# Patient Record
Sex: MALE | URBAN - METROPOLITAN AREA
[De-identification: names, ages, dates, MRNs, and addresses within clinical notes are randomized per-mention and may not be internally consistent; named-entity substitution may affect disease eponyms.]

---

## 2017-09-15 ENCOUNTER — OFFICE VISIT (OUTPATIENT)
Dept: FAMILY MEDICINE | Facility: CLINIC | Age: 27
End: 2017-09-15

## 2017-09-15 VITALS
OXYGEN SATURATION: 100 % | HEART RATE: 64 BPM | WEIGHT: 150 LBS | SYSTOLIC BLOOD PRESSURE: 120 MMHG | RESPIRATION RATE: 20 BRPM | HEIGHT: 68 IN | DIASTOLIC BLOOD PRESSURE: 70 MMHG | BODY MASS INDEX: 22.73 KG/M2 | TEMPERATURE: 98.1 F

## 2017-09-15 DIAGNOSIS — J30.81 ALLERGIC RHINITIS DUE TO ANIMAL HAIR AND DANDER, UNSPECIFIED CHRONICITY: ICD-10-CM

## 2017-09-15 DIAGNOSIS — Z00.00 ENCOUNTER FOR ROUTINE ADULT HEALTH EXAMINATION WITHOUT ABNORMAL FINDINGS: ICD-10-CM

## 2017-09-15 DIAGNOSIS — R76.12 POSITIVE QUANTIFERON-TB GOLD TEST: ICD-10-CM

## 2017-09-15 DIAGNOSIS — Z11.1 SCREENING EXAMINATION FOR PULMONARY TUBERCULOSIS: Primary | ICD-10-CM

## 2017-09-15 PROBLEM — J30.2 SEASONAL ALLERGIC RHINITIS: Status: ACTIVE | Noted: 2017-09-15

## 2017-09-15 RX ORDER — FLUTICASONE PROPIONATE 50 MCG
1-2 SPRAY, SUSPENSION (ML) NASAL DAILY
Qty: 1 BOTTLE | Refills: 11 | Status: SHIPPED | OUTPATIENT
Start: 2017-09-15 | End: 2018-06-07

## 2017-09-15 RX ORDER — LORATADINE 10 MG/1
10 TABLET ORAL DAILY
Qty: 30 TABLET | Refills: 1 | Status: SHIPPED | OUTPATIENT
Start: 2017-09-15 | End: 2019-03-13

## 2017-09-15 NOTE — MR AVS SNAPSHOT
After Visit Summary   9/15/2017    Tg Conteh    MRN: [id number]           Patient Information     Date Of Birth          1990        Visit Information        Provider Department      9/15/2017 2:20 PM Rossy Puri MD [facility]        Today's Diagnoses     Screening examination for pulmonary tuberculosis    -  1    Allergic rhinitis due to animal hair and dander, unspecified chronicity          Care Instructions    Here is the plan from today's visit    1. Screening examination for pulmonary tuberculosis  - Tuberculosis by Quantiferon    2. Allergic rhinitis due to animal hair and dander, unspecified chronicity  - fluticasone (FLONASE) 50 MCG/ACT spray; Spray 1-2 sprays into both nostrils daily  Dispense: 1 Bottle; Refill: 11  - loratadine (CLARITIN) 10 MG tablet; Take 1 tablet (10 mg) by mouth daily  Dispense: 30 tablet; Refill: 1  - You can use the flonase first, but if your symptoms do not improve that start using the Claritin.     Please call or return to clinic if your symptoms don't go away.    Follow up plan  Please make a clinic appointment for follow up with me (ROSSY PURI) in 1 year for routine visit or sooner if needed.    Thank you for coming to [facility] today.  Lab Testing:  **If you had lab testing today and your results are reassuring or normal they will be mailed to you or sent through [employer] within 7 days.   **If the lab tests need quick action we will call you with the results.  The phone number we will call with results is # [phone] (home) . If this is not the best number please call our clinic and change the number.  Medication Refills:  If you need any refills please call your pharmacy and they will contact us.   If you need to  your refill at a new pharmacy, please contact the new pharmacy directly. The new pharmacy will help you get your medications transferred faster.   Scheduling:  If you have any  concerns about today's visit or wish to schedule another appointment please call our office during normal business hours [phone] (8-5:00 M-F)  If a referral was made to a [facility] and you don't get a call from central scheduling please call [phone].  If a Mammogram was ordered for you at [facility] call [phone] to schedule or change your appointment.  If you had an XRay/CT/Ultrasound/MRI ordered the number is [phone] to schedule or change your radiology appointment.   Medical Concerns:  If you have urgent medical concerns please call [phone] at any time of the day.  If you have a medical emergency please call [phone].    Preventive Health Recommendations  Male Ages 26 - 39    Yearly exam:             See your health care provider every year in order to  o   Review health changes.   o   Discuss preventive care.    o   Review your medicines if your doctor has prescribed any.    You should be tested each year for STDs (sexually transmitted diseases), if you re at risk.     After age 35, talk to your provider about cholesterol testing. If you are at risk for heart disease, have your cholesterol tested at least every 5 years.     If you are at risk for diabetes, you should have a diabetes test (fasting glucose).  Shots: Get a flu shot each year. Get a tetanus shot every 10 years.     Nutrition:    Eat at least 5 servings of fruits and vegetables daily.     Eat whole-grain bread, whole-wheat pasta and brown rice instead of white grains and rice.     Talk to your provider about Calcium and Vitamin D.     Lifestyle    Exercise for at least 150 minutes a week (30 minutes a day, 5 days a week). This will help you control your weight and prevent disease.     Limit alcohol to one drink per day.     No smoking.     Wear sunscreen to prevent skin cancer.     See your dentist every six months for an exam and cleaning.             Follow-ups after your visit        Who to  "contact     Please call your clinic at [phone] to:    Ask questions about your health    Make or cancel appointments    Discuss your medicines    Learn about your test results    Speak to your doctor   If you have compliments or concerns about an experience at your clinic, or if you wish to file a complaint, please contact [facility] Patient Relations at [phone] or email us at [email or web address]         Additional Information About Your Visit        YourStreethart Information     [employer] gives you secure access to your electronic health record. If you see a primary care provider, you can also send messages to your care team and make appointments. If you have questions, please call your primary care clinic.  If you do not have a primary care provider, please call [phone] and they will assist you.      [email or web address] is an electronic gateway that provides easy, online access to your medical records. With [email or web address], you can request a clinic appointment, read your test results, renew a prescription or communicate with your care team.     To access your existing account, please contact your [facility] or call [phone] for assistance.        Care EveryWhere ID     This is your Care EveryWhere ID. This could be used by other organizations to access your [facility] medical records  [id number]        Your Vitals Were     Pulse Temperature Respirations Height Pulse Oximetry BMI (Body Mass Index)    64 98.1  F (36.7  C) (Oral) 20 5' 8.25\" (173.4 cm) 100% 22.64 kg/m2       Blood Pressure from Last 3 Encounters:   09/15/17 120/70   08/28/15 110/72   05/22/15 116/70    Weight from Last 3 Encounters:   09/15/17 150 lb (68 kg)   08/28/15 143 lb 12.8 oz (65.2 kg)   05/22/15 139 lb (63 kg)              We Performed the Following     Tuberculosis by Quantiferon          Today's Medication Changes          These changes are accurate as of: 9/15/17  3:39 PM.  If " you have any questions, ask your nurse or doctor.               These medicines have changed or have updated prescriptions.        Dose/Directions    * fluticasone 50 MCG/ACT spray   Commonly known as:  FLONASE   This may have changed:  Another medication with the same name was added. Make sure you understand how and when to take each.   Used for:  URI (upper respiratory infection)   Changed by:  Leticia Boudreaux MD        Dose:  1-2 spray   Spray 1-2 sprays into both nostrils daily   Quantity:  16 g   Refills:  1       * fluticasone 50 MCG/ACT spray   Commonly known as:  FLONASE   This may have changed:  You were already taking a medication with the same name, and this prescription was added. Make sure you understand how and when to take each.   Used for:  Allergic rhinitis due to animal hair and dander, unspecified chronicity   Changed by:  Rossy Nash MD        Dose:  1-2 spray   Spray 1-2 sprays into both nostrils daily   Quantity:  1 Bottle   Refills:  11       * loratadine 10 MG tablet   Commonly known as:  CLARITIN   This may have changed:  Another medication with the same name was added. Make sure you understand how and when to take each.   Used for:  Chronic rhinitis   Changed by:  Vickie Luis MD        Dose:  10 mg   Take 1 tablet (10 mg) by mouth daily   Quantity:  30 tablet   Refills:  1       * loratadine 10 MG tablet   Commonly known as:  CLARITIN   This may have changed:  You were already taking a medication with the same name, and this prescription was added. Make sure you understand how and when to take each.   Used for:  Allergic rhinitis due to animal hair and dander, unspecified chronicity   Changed by:  Rossy Nash MD        Dose:  10 mg   Take 1 tablet (10 mg) by mouth daily   Quantity:  30 tablet   Refills:  1       * Notice:  This list has 4 medication(s) that are the same as other medications prescribed for you. Read the directions carefully, and ask  your doctor or other care provider to review them with you.         Where to get your medicines      These medications were sent to [facility] [place], [place] - [address], [facility] [address]     Phone:  [phone]     fluticasone 50 MCG/ACT spray    loratadine 10 MG tablet                Primary Care Provider Office Phone # Fax #    Rossy Mone Nash [phone]       [facility] [address]  [facility] [address]        Equal Access to Services     HILARY OLSEN : Hadii aad ku hadasho Soomaali, waaxda luqadaha, qaybta kaalmada adeegyada, waxay idiin hayaan kathy atkinson . So [facility] [phone].    ATENCIÓN: Si habla español, tiene a torres disposición servicios gratuitos de asistencia lingüística. Feliciaame al [phone].    We comply with applicable federal civil rights laws and [place] laws. We do not discriminate on the basis of race, color, national origin, age, disability sex, sexual orientation or gender identity.            Thank you!     Thank you for choosing [facility]  for your care. Our goal is always to provide you with excellent care. Hearing back from our patients is one way we can continue to improve our services. Please take a few minutes to complete the written survey that you may receive in the mail after your visit with us. Thank you!             Your Updated Medication List - Protect others around you: Learn how to safely use, store and throw away your medicines at www.disposemymeds.org.          This list is accurate as of: 9/15/17  3:39 PM.  Always use your most recent med list.                   Brand Name Dispense Instructions for use Diagnosis    * fluticasone 50 MCG/ACT spray    FLONASE    16 g    Spray 1-2 sprays into both nostrils daily    URI (upper respiratory infection)       * fluticasone 50 MCG/ACT spray    FLONASE    1 Bottle    Spray 1-2 sprays into both nostrils daily     Allergic rhinitis due to animal hair and dander, unspecified chronicity       * loratadine 10 MG tablet    CLARITIN    30 tablet    Take 1 tablet (10 mg) by mouth daily    Chronic rhinitis       * loratadine 10 MG tablet    CLARITIN    30 tablet    Take 1 tablet (10 mg) by mouth daily    Allergic rhinitis due to animal hair and dander, unspecified chronicity       * Notice:  This list has 4 medication(s) that are the same as other medications prescribed for you. Read the directions carefully, and ask your doctor or other care provider to review them with you.

## 2017-09-15 NOTE — PROGRESS NOTES
Preceptor Attestation:   Patient seen and discussed with the resident. Assessment and plan reviewed with resident and agreed upon.   Supervising Physician:  Dre Roberts MD  [facility]

## 2017-09-15 NOTE — PATIENT INSTRUCTIONS
Here is the plan from today's visit    1. Screening examination for pulmonary tuberculosis  - Tuberculosis by Quantiferon    2. Allergic rhinitis due to animal hair and dander, unspecified chronicity  - fluticasone (FLONASE) 50 MCG/ACT spray; Spray 1-2 sprays into both nostrils daily  Dispense: 1 Bottle; Refill: 11  - loratadine (CLARITIN) 10 MG tablet; Take 1 tablet (10 mg) by mouth daily  Dispense: 30 tablet; Refill: 1  - You can use the flonase first, but if your symptoms do not improve that start using the Claritin.     Please call or return to clinic if your symptoms don't go away.    Follow up plan  Please make a clinic appointment for follow up with me (SHILOH PURI) in 1 year for routine visit or sooner if needed.    Thank you for coming to [facility] today.  Lab Testing:  **If you had lab testing today and your results are reassuring or normal they will be mailed to you or sent through [email or web address] within 7 days.   **If the lab tests need quick action we will call you with the results.  The phone number we will call with results is # [phone] (home) . If this is not the best number please call our clinic and change the number.  Medication Refills:  If you need any refills please call your pharmacy and they will contact us.   If you need to  your refill at a new pharmacy, please contact the new pharmacy directly. The new pharmacy will help you get your medications transferred faster.   Scheduling:  If you have any concerns about today's visit or wish to schedule another appointment please call our office during normal business hours [phone] (8-5:00 M-F)  If a referral was made to a [facility] and you don't get a call from central scheduling please call [phone].  If a Mammogram was ordered for you at [facility] call [phone] to schedule or change your appointment.  If you had an XRay/CT/Ultrasound/MRI ordered the number is [phone] to  schedule or change your radiology appointment.   Medical Concerns:  If you have urgent medical concerns please call [phone] at any time of the day.  If you have a medical emergency please call [phone].    Preventive Health Recommendations  Male Ages 26 - 39    Yearly exam:             See your health care provider every year in order to  o   Review health changes.   o   Discuss preventive care.    o   Review your medicines if your doctor has prescribed any.    You should be tested each year for STDs (sexually transmitted diseases), if you re at risk.     After age 35, talk to your provider about cholesterol testing. If you are at risk for heart disease, have your cholesterol tested at least every 5 years.     If you are at risk for diabetes, you should have a diabetes test (fasting glucose).  Shots: Get a flu shot each year. Get a tetanus shot every 10 years.     Nutrition:    Eat at least 5 servings of fruits and vegetables daily.     Eat whole-grain bread, whole-wheat pasta and brown rice instead of white grains and rice.     Talk to your provider about Calcium and Vitamin D.     Lifestyle    Exercise for at least 150 minutes a week (30 minutes a day, 5 days a week). This will help you control your weight and prevent disease.     Limit alcohol to one drink per day.     No smoking.     Wear sunscreen to prevent skin cancer.     See your dentist every six months for an exam and cleaning.

## 2017-09-15 NOTE — PROGRESS NOTES
Male Physical Note          HPI         Concerns today: need quantiferon test for job. Would like to discuss allergies.       Patient Active Problem List   Diagnosis     Seasonal allergic rhinitis       History reviewed. No pertinent past medical history.    Previous Medical Care   [facility]      Family History   Problem Relation Age of Onset     Asthma Mother      DIABETES No family hx of      Coronary Artery Disease No family hx of      Hypertension No family hx of      Cancer - colorectal No family hx of      Prostate Cancer No family hx of               Review of Systems:     Review of Systems:  CONSTITUTIONAL: NEGATIVE for fever, chills, change in weight  INTEGUMENTARY/SKIN: NEGATIVE for worrisome rashes, moles or lesions  EYES: NEGATIVE for vision changes or irritation  ENT/MOUTH: NEGATIVE for ear, mouth and throat problems  RESP: NEGATIVE for significant cough or SOB  BREAST: NEGATIVE for masses, tenderness or discharge  CV: NEGATIVE for chest pain, palpitations or peripheral edema  GI: NEGATIVE for nausea, abdominal pain, heartburn, or change in bowel habits  : NEGATIVE for frequency, dysuria, or hematuria  MUSCULOSKELETAL: NEGATIVE for significant arthralgias or myalgia  NEURO: NEGATIVE for weakness, dizziness or paresthesias  ENDOCRINE: NEGATIVE for temperature intolerance, skin/hair changes  HEME/ALLERGY: NEGATIVE for bleeding problems  PSYCHIATRIC: NEGATIVE for changes in mood or affect  Sleep:   Do you snore most or the night (as reported by a family member)? No  Do you feel sleepy or extremely tired during most of the day? 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     Social History Main Topics     Smoking status: Never Smoker     Smokeless tobacco: Never Used     Alcohol use No     Drug use: No     Sexual  "activity: Yes     Partners: Female     Other Topics Concern     Not on file     Social History Narrative       Marital Status:, wife in [place], one child.   Who lives in your household? Cousin, mother.     Has anyone hurt you physically, for example by pushing, hitting, slapping or kicking you or forcing you to have sex? Denies  Do you feel threatened or controlled by a partner, ex-partner or anyone in your life? Denies    Sexual Health     Sexual concerns: No   STI History: Neg    Recommended Screening     Blood Pressure screen. No issues, normal BP today.          Physical Exam:     Vitals: /70  Pulse 64  Temp 98.1  F (36.7  C) (Oral)  Resp 20  Ht 1.734 m (5' 8.25\")  Wt 68 kg (150 lb)  SpO2 100%  BMI 22.64 kg/m2  BMI= Body mass index is 22.64 kg/(m^2).  GENERAL: healthy, alert and no distress  EYES: Eyes grossly normal to inspection, extraocular movements - intact, and PERRL  HENT: ear canals- normal; TMs- normal; Nose- normal; Mouth- no ulcers, no lesions  NECK: no tenderness, no adenopathy, no asymmetry, no masses, no stiffness; thyroid- normal to palpation  RESP: lungs clear to auscultation - no rales, no rhonchi, no wheezes  BREAST: no masses, no tenderness, no nipple discharge, no palpable axillary masses or adenopathy  CV: regular rates and rhythm, normal S1 S2, no S3 or S4 and no murmur, no click or rub -  ABDOMEN: soft, no tenderness, no  hepatosplenomegaly, no masses, normal bowel sounds  MS: extremities- no gross deformities noted, no edema  SKIN: no suspicious lesions, no rashes  NEURO: strength and tone- normal, sensory exam- grossly normal, mentation- intact, speech- normal, reflexes- symmetric  BACK: no CVA tenderness, no paralumbar tenderness  - male: deferred due to patient request due to no acute issues.   RECTAL- male: deferred due to patient request due to no acute issues.  PSYCH: Alert and oriented times 3; speech- coherent , normal rate and volume; able to articulate " logical thoughts, able to abstract reason, no tangential thoughts, no hallucinations or delusions, affect- normal  LYMPHATICS: ant. cervical- normal, post. cervical- normal, axillary- normal, supraclavicular- normal    Assessment and Plan      Tg was seen today for physical.    Diagnoses and all orders for this visit:    Screening examination for pulmonary tuberculosis  -     Tuberculosis by Quantiferon    Allergic rhinitis due to animal hair and dander, unspecified chronicity  -     fluticasone (FLONASE) 50 MCG/ACT spray; Spray 1-2 sprays into both nostrils daily  -     loratadine (CLARITIN) 10 MG tablet; Take 1 tablet (10 mg) by mouth daily    Encounter for routine adult health examination without abnormal findings        Options for treatment and follow-up care were reviewed with the patient. Tg Elias Rolonan and/or guardian engaged in the decision making process and verbalized understanding of the options discussed and agreed with the final plan.    Rossy Nash MD

## 2017-09-19 LAB
M TB TUBERC IFN-G BLD QL: POSITIVE
M TB TUBERC IFN-G/MITOGEN IGNF BLD: 7.23 IU/ML

## 2018-06-07 ENCOUNTER — OFFICE VISIT (OUTPATIENT)
Dept: FAMILY MEDICINE | Facility: CLINIC | Age: 28
End: 2018-06-07
Payer: COMMERCIAL

## 2018-06-07 VITALS
WEIGHT: 147.2 LBS | HEART RATE: 65 BPM | SYSTOLIC BLOOD PRESSURE: 126 MMHG | DIASTOLIC BLOOD PRESSURE: 82 MMHG | OXYGEN SATURATION: 96 % | BODY MASS INDEX: 22.22 KG/M2

## 2018-06-07 DIAGNOSIS — R09.81 NASAL CONGESTION: ICD-10-CM

## 2018-06-07 DIAGNOSIS — J30.81 ALLERGIC RHINITIS DUE TO ANIMAL HAIR AND DANDER, UNSPECIFIED CHRONICITY: Primary | ICD-10-CM

## 2018-06-07 RX ORDER — LORATADINE 10 MG/1
10 TABLET ORAL DAILY
Qty: 90 TABLET | Refills: 1 | Status: SHIPPED | OUTPATIENT
Start: 2018-06-07 | End: 2019-03-13

## 2018-06-07 RX ORDER — OXYMETAZOLINE HYDROCHLORIDE 0.05 G/100ML
2-3 SPRAY NASAL 2 TIMES DAILY PRN
Qty: 1 BOTTLE | Refills: 0 | Status: SHIPPED | OUTPATIENT
Start: 2018-06-07 | End: 2019-03-13

## 2018-06-07 RX ORDER — FLUTICASONE PROPIONATE 50 MCG
1-2 SPRAY, SUSPENSION (ML) NASAL DAILY
Qty: 1 BOTTLE | Refills: 11 | Status: SHIPPED | OUTPATIENT
Start: 2018-06-07 | End: 2019-03-13

## 2018-06-07 ASSESSMENT — ENCOUNTER SYMPTOMS
SINUS PRESSURE: 0
HEADACHES: 1
FEVER: 0
TROUBLE SWALLOWING: 0
VOICE CHANGE: 0
WHEEZING: 0
CONSTIPATION: 0
LIGHT-HEADEDNESS: 0
EYE ITCHING: 1
SORE THROAT: 1
DIARRHEA: 0
COUGH: 1
ACTIVITY CHANGE: 0
SHORTNESS OF BREATH: 0
MYALGIAS: 0
FATIGUE: 0
EYE REDNESS: 1
RHINORRHEA: 1
APPETITE CHANGE: 0
ABDOMINAL PAIN: 0
NAUSEA: 0
EYE DISCHARGE: 1
CHILLS: 0
ARTHRALGIAS: 0
WEAKNESS: 0

## 2018-06-07 NOTE — PROGRESS NOTES
Preceptor Attestation:   Patient seen, evaluated and discussed with the resident. I have verified the content of the note, which accurately reflects my assessment of the patient and the plan of care.   Supervising Physician:  Hernan Heard MD MD

## 2018-06-07 NOTE — PATIENT INSTRUCTIONS
Here is the plan from today's visit    1. Allergic rhinitis due to animal hair and dander, unspecified chronicity  - fluticasone (FLONASE) 50 MCG/ACT spray; Spray 1-2 sprays into both nostrils daily  Dispense: 1 Bottle; Refill: 11  - loratadine (CLARITIN) 10 MG tablet; Take 1 tablet (10 mg) by mouth daily  Dispense: 90 tablet; Refill: 1  - ketotifen (ZADITOR/REFRESH ANTI-ITCH) 0.025 % SOLN ophthalmic solution; Place 1 drop into both eyes 2 times daily  Dispense: 1 Bottle; Refill: 1  - Would highly recommend that you use the fluticasone daily in addition to the claritin (tablet) and the eye drops.     2. Nasal congestion  - oxymetazoline (AFRIN NASAL SPRAY) 0.05 % spray; Spray 2-3 sprays into both nostrils 2 times daily as needed for congestion For 3 days and then stop using.  Dispense: 1 Bottle; Refill: 0  - do not take for more than 3 days at time. You can use this medication up to 3 times a month (for 3 days at a time).       Please call or return to clinic if your symptoms don't go away.    Follow up plan  Please make a clinic appointment for follow up with me (SHILOH PURI) in 3-4  weeks for follow up.    Thank you for coming to [facility] today.  Lab Testing:  **If you had lab testing today and your results are reassuring or normal they will be mailed to you or sent through [employer] within 7 days.   **If the lab tests need quick action we will call you with the results.  The phone number we will call with results is # [phone] (home) . If this is not the best number please call our clinic and change the number.  Medication Refills:  If you need any refills please call your pharmacy and they will contact us.   If you need to  your refill at a new pharmacy, please contact the new pharmacy directly. The new pharmacy will help you get your medications transferred faster.   Scheduling:  If you have any concerns about today's visit or wish to schedule another appointment please call our office  during normal business hours [phone] (8-5:00 M-F)  If a referral was made to a [facility] and you don't get a call from central scheduling please call [phone].  If a Mammogram was ordered for you at [facility] call [phone] to schedule or change your appointment.  If you had an XRay/CT/Ultrasound/MRI ordered the number is [phone] to schedule or change your radiology appointment.   Medical Concerns:  If you have urgent medical concerns please call [phone] at any time of the day.    Rossy Nash MD

## 2018-06-07 NOTE — MR AVS SNAPSHOT
After Visit Summary   6/7/2018    Tg Conteh    MRN: [id number]           Patient Information     Date Of Birth          1990        Visit Information        Provider Department      6/7/2018 9:40 AM Rossy Puri MD [facility]        Today's Diagnoses     Nasal congestion    -  1    Allergic rhinitis due to animal hair and dander, unspecified chronicity          Care Instructions    Here is the plan from today's visit    1. Allergic rhinitis due to animal hair and dander, unspecified chronicity  - fluticasone (FLONASE) 50 MCG/ACT spray; Spray 1-2 sprays into both nostrils daily  Dispense: 1 Bottle; Refill: 11  - loratadine (CLARITIN) 10 MG tablet; Take 1 tablet (10 mg) by mouth daily  Dispense: 90 tablet; Refill: 1  - ketotifen (ZADITOR/REFRESH ANTI-ITCH) 0.025 % SOLN ophthalmic solution; Place 1 drop into both eyes 2 times daily  Dispense: 1 Bottle; Refill: 1  - Would highly recommend that you use the fluticasone daily in addition to the claritin (tablet) and the eye drops.     2. Nasal congestion  - oxymetazoline (AFRIN NASAL SPRAY) 0.05 % spray; Spray 2-3 sprays into both nostrils 2 times daily as needed for congestion For 3 days and then stop using.  Dispense: 1 Bottle; Refill: 0  - do not take for more than 3 days at time. You can use this medication up to 3 times a month (for 3 days at a time).       Please call or return to clinic if your symptoms don't go away.    Follow up plan  Please make a clinic appointment for follow up with me (ROSSY PURI) in 3-4  weeks for follow up.    Thank you for coming to [facility] today.  Lab Testing:  **If you had lab testing today and your results are reassuring or normal they will be mailed to you or sent through "[employer] within 7 days.   **If the lab tests need quick action we will call you with the results.  The phone number we will call with results is # [phone] (home) . If this is not the  best number please call our clinic and change the number.  Medication Refills:  If you need any refills please call your pharmacy and they will contact us.   If you need to  your refill at a new pharmacy, please contact the new pharmacy directly. The new pharmacy will help you get your medications transferred faster.   Scheduling:  If you have any concerns about today's visit or wish to schedule another appointment please call our office during normal business hours [phone] (8-5:00 M-F)  If a referral was made to a [facility] and you don't get a call from central scheduling please call [phone].  If a Mammogram was ordered for you at [facility] call [phone] to schedule or change your appointment.  If you had an XRay/CT/Ultrasound/MRI ordered the number is [phone] to schedule or change your radiology appointment.   Medical Concerns:  If you have urgent medical concerns please call [phone] at any time of the day.    Rossy Nash MD            Follow-ups after your visit        Who to contact     Please call your clinic at [phone] to:    Ask questions about your health    Make or cancel appointments    Discuss your medicines    Learn about your test results    Speak to your doctor            Additional Information About Your Visit        [employer] Information     [employer] gives you secure access to your electronic health record. If you see a primary care provider, you can also send messages to your care team and make appointments. If you have questions, please call your primary care clinic.  If you do not have a primary care provider, please call [phone] and they will assist you.      [employer] is an electronic gateway that provides easy, online access to your medical records. With [employer], you can request a clinic appointment, read your test results, renew a prescription or communicate with your care team.     To access your existing account,  please contact your [facility] or call [phone] for assistance.        Care EveryWhere ID     This is your Care EveryWhere ID. This could be used by other organizations to access your [facility] medical records  [id number]        Your Vitals Were     Pulse Pulse Oximetry BMI (Body Mass Index)             65 96% 22.22 kg/m2          Blood Pressure from Last 3 Encounters:   06/07/18 126/82   09/15/17 120/70   08/28/15 110/72    Weight from Last 3 Encounters:   06/07/18 147 lb 3.2 oz (66.8 kg)   09/15/17 150 lb (68 kg)   08/28/15 143 lb 12.8 oz (65.2 kg)              Today, you had the following     No orders found for display         Today's Medication Changes          These changes are accurate as of 6/7/18 10:33 AM.  If you have any questions, ask your nurse or doctor.               Start taking these medicines.        Dose/Directions    ketotifen 0.025 % Soln ophthalmic solution   Commonly known as:  ZADITOR/REFRESH ANTI-ITCH   Used for:  Allergic rhinitis due to animal hair and dander, unspecified chronicity   Started by:  Rossy Nash MD        Dose:  1 drop   Place 1 drop into both eyes 2 times daily   Quantity:  1 Bottle   Refills:  1       oxymetazoline 0.05 % spray   Commonly known as:  AFRIN NASAL SPRAY   Used for:  Nasal congestion   Started by:  Rsosy Nash MD        Dose:  2-3 spray   Spray 2-3 sprays into both nostrils 2 times daily as needed for congestion For 3 days and then stop using.   Quantity:  1 Bottle   Refills:  0         These medicines have changed or have updated prescriptions.        Dose/Directions    * loratadine 10 MG tablet   Commonly known as:  CLARITIN   This may have changed:  Another medication with the same name was added. Make sure you understand how and when to take each.   Used for:  Allergic rhinitis due to animal hair and dander, unspecified chronicity   Changed by:  Rossy Nash MD        Dose:  10 mg   Take 1  tablet (10 mg) by mouth daily   Quantity:  30 tablet   Refills:  1       * loratadine 10 MG tablet   Commonly known as:  CLARITIN   This may have changed:  You were already taking a medication with the same name, and this prescription was added. Make sure you understand how and when to take each.   Used for:  Allergic rhinitis due to animal hair and dander, unspecified chronicity   Changed by:  Rossy Nash MD        Dose:  10 mg   Take 1 tablet (10 mg) by mouth daily   Quantity:  90 tablet   Refills:  1       * Notice:  This list has 2 medication(s) that are the same as other medications prescribed for you. Read the directions carefully, and ask your doctor or other care provider to review them with you.         Where to get your medicines      These medications were sent to [facility] [place], [place] - [address] [facility] [address]     Phone:  [phone]     fluticasone 50 MCG/ACT spray    ketotifen 0.025 % Soln ophthalmic solution    loratadine 10 MG tablet    oxymetazoline 0.05 % spray                Primary Care Provider Office Phone # Fax #    Rossy Nash [phone]       [facility] [address]  [facility] [address]        Equal Access to Services     HILARY OLSEN AH: Hadii solomon connell hadasho Soomaali, waaxda luqadaha, qaybta kaalmada adeegyada, waxay nathalyin hayjayan kathy shoemaker. So [facility] [phone].    ATENCIÓN: Si habla español, tiene a torres disposición servicios gratuitos de asistencia lingüística. Llame al [phone].    We comply with applicable federal civil rights laws and [place] laws. We do not discriminate on the basis of race, color, national origin, age, disability, sex, sexual orientation, or gender identity.            Thank you!     Thank you for choosing [facility]  for your care. Our goal is always to provide you with excellent care. Hearing back from our patients is one  way we can continue to improve our services. Please take a few minutes to complete the written survey that you may receive in the mail after your visit with us. Thank you!             Your Updated Medication List - Protect others around you: Learn how to safely use, store and throw away your medicines at www.disposemymeds.org.          This list is accurate as of 6/7/18 10:33 AM.  Always use your most recent med list.                   Brand Name Dispense Instructions for use Diagnosis    fluticasone 50 MCG/ACT spray    FLONASE    1 Bottle    Spray 1-2 sprays into both nostrils daily    Allergic rhinitis due to animal hair and dander, unspecified chronicity       ketotifen 0.025 % Soln ophthalmic solution    ZADITOR/REFRESH ANTI-ITCH    1 Bottle    Place 1 drop into both eyes 2 times daily    Allergic rhinitis due to animal hair and dander, unspecified chronicity       * loratadine 10 MG tablet    CLARITIN    30 tablet    Take 1 tablet (10 mg) by mouth daily    Allergic rhinitis due to animal hair and dander, unspecified chronicity       * loratadine 10 MG tablet    CLARITIN    90 tablet    Take 1 tablet (10 mg) by mouth daily    Allergic rhinitis due to animal hair and dander, unspecified chronicity       oxymetazoline 0.05 % spray    AFRIN NASAL SPRAY    1 Bottle    Spray 2-3 sprays into both nostrils 2 times daily as needed for congestion For 3 days and then stop using.    Nasal congestion       * Notice:  This list has 2 medication(s) that are the same as other medications prescribed for you. Read the directions carefully, and ask your doctor or other care provider to review them with you.

## 2018-07-02 ENCOUNTER — APPOINTMENT (OUTPATIENT)
Dept: FAMILY MEDICINE | Facility: CLINIC | Age: 28
End: 2018-07-02
Payer: COMMERCIAL

## 2019-03-13 ENCOUNTER — OFFICE VISIT (OUTPATIENT)
Dept: FAMILY MEDICINE | Facility: CLINIC | Age: 29
End: 2019-03-13
Payer: COMMERCIAL

## 2019-03-13 VITALS
BODY MASS INDEX: 24.21 KG/M2 | WEIGHT: 160.4 LBS | HEART RATE: 66 BPM | SYSTOLIC BLOOD PRESSURE: 109 MMHG | OXYGEN SATURATION: 97 % | TEMPERATURE: 98.2 F | DIASTOLIC BLOOD PRESSURE: 61 MMHG

## 2019-03-13 DIAGNOSIS — J30.81 ALLERGIC RHINITIS DUE TO ANIMAL HAIR AND DANDER: Primary | ICD-10-CM

## 2019-03-13 RX ORDER — LORATADINE 10 MG/1
10 TABLET ORAL DAILY
Qty: 90 TABLET | Refills: 1 | Status: SHIPPED | OUTPATIENT
Start: 2019-03-13

## 2019-03-13 RX ORDER — FLUTICASONE PROPIONATE 50 MCG
1-2 SPRAY, SUSPENSION (ML) NASAL DAILY
Qty: 18.2 ML | Refills: 0 | Status: SHIPPED | OUTPATIENT
Start: 2019-03-13

## 2019-03-13 ASSESSMENT — ENCOUNTER SYMPTOMS
FEVER: 0
SHORTNESS OF BREATH: 0
DIARRHEA: 0
WHEEZING: 0
SORE THROAT: 1
CHILLS: 0
ABDOMINAL PAIN: 0
VOMITING: 0
EYES NEGATIVE: 1
RHINORRHEA: 1
COUGH: 1

## 2019-03-13 NOTE — PROGRESS NOTES
HPI       Tg Conteh is a 29 year old  who presents for   Chief Complaint   Patient presents with     RECHECK     sore throat x1 week     Acute Illness   Concerns: sore throat, worse in the morning when he wakes up, spit out saliva with bloody sputum for 4 days about 2 days ago, feels itchy in throat and ears   When did it start? One week   Is it getting better, worse or staying the same? unchanged    Fatigue/Achiness?:No     Fever?: No     Chills/Sweats?: No     Headache (location?)No     Sinus Pressure?:No     Eye redness/Discharge?: No     Ear Pain?: No     Runny nose?: No     Congestion?:  YES mild nasal congestion, throat congestion     Sore Throat?: No   Respiratory    Cough?:  YES-non-productive    Wheeze?: No   GI/    Decreased Appetite?: No     Nausea?:No     Vomiting?: No     Diarrhea?:  No     Dysuria/Frequency?.:No       Any Illness Exposure?:  none    Any foreign travel or contact with anyone ill who travelled abroad?  YES Traveled to [facility] and [facility] without sickness and returned to  12/2018    Therapies Tried and outcome: Nyquil cold and flu, Details: helps a little     - Also using warm tea with lemon and chito that helps his sore throat  - He used Flonase last year that helped allergy symptoms  - He completed latent TB treatment in 2012 with isoniazid.     +++++++    Problem, Medication and Allergy Lists were reviewed and updated if needed..    Patient is an established patient of this clinic.  No past medical history on file.  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   Other Topics Concern     Not on file   Social History Narrative     Not on file   Came to [place] in [place] from [place]          Review of Systems:   Review of Systems   Constitutional: Negative for chills and fever.   HENT: Positive for congestion, postnasal drip, rhinorrhea and sore throat. Negative for ear pain.    Eyes: Negative.    Respiratory: Positive for cough. Negative for shortness of breath and wheezing.    Cardiovascular: Negative for chest pain.   Gastrointestinal: Negative for abdominal pain, diarrhea and vomiting.            Physical Exam:     Vitals:    03/13/19 0937   BP: 109/61   Pulse: 66   Temp: 98.2  F (36.8  C)   TempSrc: Oral   SpO2: 97%   Weight: 72.8 kg (160 lb 6.4 oz)     Body mass index is 24.21 kg/m .  Vitals were reviewed and were normal     Physical Exam   Constitutional: He appears well-developed and well-nourished. No distress.   HENT:   Head: Normocephalic and atraumatic.   Cobblestoning of throat   Eyes: Conjunctivae are normal. No scleral icterus.   Cardiovascular: Normal rate, regular rhythm and normal heart sounds.   No murmur heard.  Pulmonary/Chest: Effort normal and breath sounds normal. He has no wheezes.   Abdominal: Soft. Bowel sounds are normal. He exhibits no distension. There is no tenderness.   Skin: Skin is warm and dry. No rash noted.   Psychiatric: He has a normal mood and affect. His behavior is normal.         Results:      No results for this visit.   Assessment and Plan        Tg was seen today for recheck.    Diagnoses and all orders for this visit:    Allergic rhinitis due to animal hair and dander  Recommended flonase nasal spray and claritin daily. Follow up if no improvement in 2 weeks.   -     fluticasone (FLONASE) 50 MCG/ACT nasal spray; Spray 1-2 sprays into both nostrils daily  -     loratadine (CLARITIN) 10 MG tablet; Take 1 tablet (10 mg) by mouth daily          There are no discontinued medications.    Options for treatment and follow-up care were reviewed with the patient. Tg Conteh  engaged in the decision making process and verbalized understanding of the options discussed and agreed with the final plan.    Sylvia Fischer,

## 2019-03-13 NOTE — PATIENT INSTRUCTIONS
Here is the plan from today's visit    1. Allergic rhinitis due to animal hair and dander  - fluticasone (FLONASE) 50 MCG/ACT nasal spray; Spray 1-2 sprays into both nostrils daily  Dispense: 18.2 mL; Refill: 0  - loratadine (CLARITIN) 10 MG tablet; Take 1 tablet (10 mg) by mouth daily  Dispense: 90 tablet; Refill: 1    Please call or return to clinic if your symptoms don't go away.    Follow up plan  Follow up if you are not improving in the next 1-2 weeks.     Thank you for coming to [facility] today.  Lab Testing:  **If you had lab testing today and your results are reassuring or normal they will be mailed to you or sent through [employer] within 7 days.   **If the lab tests need quick action we will call you with the results.  The phone number we will call with results is # [phone] (home) . If this is not the best number please call our clinic and change the number.  Medication Refills:  If you need any refills please call your pharmacy and they will contact us.   If you need to  your refill at a new pharmacy, please contact the new pharmacy directly. The new pharmacy will help you get your medications transferred faster.   Scheduling:  If you have any concerns about today's visit or wish to schedule another appointment please call our office during normal business hours [phone] (8-5:00 M-F)  If a referral was made to a [facility] and you don't get a call from central scheduling please call [phone].  If a Mammogram was ordered for you at [facility] call [phone] to schedule or change your appointment.  If you had an XRay/CT/Ultrasound/MRI ordered the number is [phone] to schedule or change your radiology appointment.   Medical Concerns:  If you have urgent medical concerns please call [phone] at any time of the day.    Sylvia Fischer MD

## 2019-03-19 NOTE — PROGRESS NOTES
Preceptor Attestation:   Patient seen, evaluated and discussed with the resident. I have verified the content of the note, which accurately reflects my assessment of the patient and the plan of care.   Supervising Physician:  Jose G Caceres MD

## 2019-05-02 ENCOUNTER — OFFICE VISIT (OUTPATIENT)
Dept: FAMILY MEDICINE | Facility: CLINIC | Age: 29
End: 2019-05-02
Payer: COMMERCIAL

## 2019-05-02 VITALS
HEIGHT: 69 IN | SYSTOLIC BLOOD PRESSURE: 122 MMHG | TEMPERATURE: 98 F | BODY MASS INDEX: 22.36 KG/M2 | HEART RATE: 66 BPM | RESPIRATION RATE: 18 BRPM | DIASTOLIC BLOOD PRESSURE: 78 MMHG | WEIGHT: 151 LBS | OXYGEN SATURATION: 97 %

## 2019-05-02 DIAGNOSIS — J30.2 SEASONAL ALLERGIES: Primary | ICD-10-CM

## 2019-05-02 DIAGNOSIS — J31.0 CHRONIC RHINITIS: ICD-10-CM

## 2019-05-02 RX ORDER — PSEUDOEPHEDRINE HCL 120 MG/1
120 TABLET, FILM COATED, EXTENDED RELEASE ORAL EVERY MORNING
Qty: 30 TABLET | Refills: 0 | Status: SHIPPED | OUTPATIENT
Start: 2019-05-02

## 2019-05-02 RX ORDER — OLOPATADINE HYDROCHLORIDE 2 MG/ML
1 SOLUTION/ DROPS OPHTHALMIC DAILY
Qty: 2.5 ML | Refills: 3 | Status: SHIPPED | OUTPATIENT
Start: 2019-05-02

## 2019-05-02 ASSESSMENT — ENCOUNTER SYMPTOMS
FATIGUE: 0
SINUS PAIN: 0
DIARRHEA: 0
EYE ITCHING: 1
VOMITING: 0
ABDOMINAL PAIN: 0
NAUSEA: 0
EYE REDNESS: 1
WHEEZING: 0
FEVER: 0
SORE THROAT: 1
COUGH: 0
CHILLS: 0
SINUS PRESSURE: 0

## 2019-05-02 ASSESSMENT — MIFFLIN-ST. JEOR: SCORE: 1627.43

## 2019-05-02 NOTE — PROGRESS NOTES
"       HPI       Tg Conteh is a 29 year old  who presents for   Chief Complaint   Patient presents with     Allergies     follow up, itching in nose      Pharyngitis     on and off         Concern: allergies for the last month   Description of the problem : Patient was seen in early March for similar symptoms.  Was prescribed Claritin and Flonase.  Claritin has been helping, but used Flonase for 1 week and then discontinued because he did feel like it was helping.  Still with lots of nasal congestion making it difficult to breathe out of his nose.  Eye redness and itching, sneezing, sore throat.  Denies fever, chills, cough, wheezing, or excessive fatigue.       Problem, Medication and Allergy Lists were reviewed and updated if needed..    Patient is an established patient of this clinic..         Review of Systems:   Review of Systems   Constitutional: Negative for chills, fatigue and fever.   HENT: Positive for congestion, sneezing and sore throat. Negative for ear discharge, ear pain, sinus pressure and sinus pain.    Eyes: Positive for redness and itching.   Respiratory: Negative for cough and wheezing.    Gastrointestinal: Negative for abdominal pain, diarrhea, nausea and vomiting.            Physical Exam:     Vitals:    05/02/19 0850   BP: 122/78   Pulse: 66   Resp: 18   Temp: 98  F (36.7  C)   TempSrc: Oral   SpO2: 97%   Weight: 68.5 kg (151 lb)   Height: 1.74 m (5' 8.5\")     Body mass index is 22.62 kg/m .  Vitals were reviewed and were normal     Physical Exam   Constitutional: He is oriented to person, place, and time. He appears well-developed and well-nourished. No distress.   HENT:   Head: Normocephalic and atraumatic.   Nose: Mucosal edema and rhinorrhea present. Right sinus exhibits no maxillary sinus tenderness and no frontal sinus tenderness. Left sinus exhibits no maxillary sinus tenderness and no frontal sinus tenderness.   Mouth/Throat: Oropharynx is clear and moist and mucous " membranes are normal. No tonsillar exudate.   Unable to visualize TM due to cerumen.   Eyes: Right eye exhibits no discharge. Left eye exhibits no discharge. Right conjunctiva is injected. Left conjunctiva is injected.   Cardiovascular: Normal rate and regular rhythm. Exam reveals no gallop and no friction rub.   No murmur heard.  Pulmonary/Chest: Effort normal. No respiratory distress. He has no wheezes. He has no rales.   Lymphadenopathy:     He has no cervical adenopathy.   Neurological: He is alert and oriented to person, place, and time.   Skin: Skin is warm and dry.   Psychiatric: He has a normal mood and affect. His behavior is normal.         Results:   No testing ordered today    Assessment and Plan        Tg was seen today for allergies and pharyngitis.    Diagnoses and all orders for this visit:    Seasonal allergies  Chronic rhinitis  Patient's continued symptoms are consistent with seasonal allergies.  Recommend to continue Claritin and restart Flonase.  Try Pataday eyedrops and pseudoephedrine for congestion.  Discussed allergy referral if symptoms are not relieved by these medications in the 2 to 4 weeks.  -     olopatadine (PATADAY) 0.2 % ophthalmic solution; Place 1 drop into both eyes daily  -     pseudoePHEDrine (SUDAFED) 120 MG 12 hr tablet; Take 1 tablet (120 mg) by mouth every morning       There are no discontinued medications.    Options for treatment and follow-up care were reviewed with the patient. Tg Conteh  engaged in the decision making process and verbalized understanding of the options discussed and agreed with the final plan.    Maryjo Saleh MD  PGY-2

## 2020-03-02 ENCOUNTER — HEALTH MAINTENANCE LETTER (OUTPATIENT)
Age: 30
End: 2020-03-02

## 2020-12-14 ENCOUNTER — HEALTH MAINTENANCE LETTER (OUTPATIENT)
Age: 30
End: 2020-12-14

## 2021-04-18 ENCOUNTER — HEALTH MAINTENANCE LETTER (OUTPATIENT)
Age: 31
End: 2021-04-18

## 2021-10-02 ENCOUNTER — HEALTH MAINTENANCE LETTER (OUTPATIENT)
Age: 31
End: 2021-10-02

## 2022-05-14 ENCOUNTER — HEALTH MAINTENANCE LETTER (OUTPATIENT)
Age: 32
End: 2022-05-14

## 2022-09-03 ENCOUNTER — HEALTH MAINTENANCE LETTER (OUTPATIENT)
Age: 32
End: 2022-09-03

## 2023-06-03 ENCOUNTER — HEALTH MAINTENANCE LETTER (OUTPATIENT)
Age: 33
End: 2023-06-03

## 2024-03-12 NOTE — PROGRESS NOTES
HPI:       Tg Conteh is a 28 year old who presents for the following  Patient presents with:  Sinus Problem: watery itchy eyes, runny nose. tried loratidine and aleve with some help    Started late April. Stuffy nose, water eyes, sinus pressure.     Allergies?     Onset: late April    Description:   Nasal congestion:  YES  Sneezing:  YES, constant  Red, itchy eyes:  YES    Progression of Symptoms:      Worse, constant    Accompanying Signs & Symptoms:  Cough:  YES sometimes  Wheezing: No  Rash: No  Sinus/facial pain:  YES maxillary    History:   Is it seasonal:   in the fall and in the spring   History of asthma: No   Has allergy testing been done: No     What makes it worse?:             spring, pollen, tree, Details    What makes it better?             Loratadine, Details:    Therapies Tried and outcome: loratidine, Details      Problem, Medication and Allergy Lists were   reviewed and are current.     Patient Active Problem List    Diagnosis Date Noted     Seasonal allergic rhinitis 09/15/2017     Priority: Medium   ,     Current Outpatient Prescriptions   Medication Sig Dispense Refill     fluticasone (FLONASE) 50 MCG/ACT spray Spray 1-2 sprays into both nostrils daily 1 Bottle 11     loratadine (CLARITIN) 10 MG tablet Take 1 tablet (10 mg) by mouth daily 30 tablet 1   ,   No Known Allergies  Patient is an established patient of this clinic.         Review of Systems:   Review of Systems   Constitutional: Negative for activity change, appetite change, chills, fatigue and fever.   HENT: Positive for congestion, postnasal drip, rhinorrhea, sneezing and sore throat. Negative for ear pain, sinus pressure, trouble swallowing and voice change.    Eyes: Positive for discharge, redness and itching.   Respiratory: Positive for cough. Negative for shortness of breath and wheezing.    Cardiovascular: Negative for leg swelling.   Gastrointestinal: Negative for abdominal pain, constipation, diarrhea and  nausea.   Musculoskeletal: Negative for arthralgias and myalgias.   Skin: Negative for rash.   Allergic/Immunologic: Positive for environmental allergies.   Neurological: Positive for headaches. Negative for weakness and light-headedness.             Physical Exam:   Patient Vitals for the past 24 hrs:   BP Pulse SpO2 Weight   06/07/18 0957 126/82 65 96 % 147 lb 3.2 oz (66.8 kg)     Body mass index is 22.22 kg/(m^2).  Vitals were reviewed and were normal     Physical Exam   Constitutional: He appears well-developed and well-nourished. No distress.   HENT:   Head: Normocephalic.   Right Ear: External ear normal.   Left Ear: External ear normal.   Nose: Mucosal edema and rhinorrhea present. Right sinus exhibits maxillary sinus tenderness. Right sinus exhibits no frontal sinus tenderness. Left sinus exhibits maxillary sinus tenderness. Left sinus exhibits no frontal sinus tenderness.   Mouth/Throat: Oropharynx is clear and moist and mucous membranes are normal.   Eyes: Conjunctivae, EOM and lids are normal. Pupils are equal, round, and reactive to light.   Neck: Normal range of motion. Neck supple. No thyromegaly present.   Cardiovascular: Normal rate and regular rhythm.    No murmur heard.  Pulmonary/Chest: Effort normal. No respiratory distress. He has no wheezes.   Lymphadenopathy:     He has no cervical adenopathy.   Skin: No rash noted. He is not diaphoretic.         Results:     No investigations this encounter.     Assessment and Plan     Tg is a 29 yo male with a history of seasonal allergies who presents to the clinic with worsening allergy symptoms with some improvement of symptoms with loratadine. Will escalate treatment at this time. Advised to use Afrin for no more than 3 days at a time. If symptoms do not improve with this regimen, will likely need to adjust medication.      1. Allergic rhinitis due to animal hair and dander, unspecified chronicity  - fluticasone (FLONASE) 50 MCG/ACT spray; Spray 1-2  sprays into both nostrils daily  Dispense: 1 Bottle; Refill: 11  - loratadine (CLARITIN) 10 MG tablet; Take 1 tablet (10 mg) by mouth daily  Dispense: 90 tablet; Refill: 1  - ketotifen (ZADITOR/REFRESH ANTI-ITCH) 0.025 % SOLN ophthalmic solution; Place 1 drop into both eyes 2 times daily  Dispense: 1 Bottle; Refill: 1    2. Nasal congestion  - oxymetazoline (AFRIN NASAL SPRAY) 0.05 % spray; Spray 2-3 sprays into both nostrils 2 times daily as needed for congestion For 3 days and then stop using.  Dispense: 1 Bottle; Refill: 0    There are no discontinued medications.  Options for treatment and follow-up care were reviewed with the patient. Tg Conteh  engaged in the decision making process and verbalized understanding of the options discussed and agreed with the final plan.    Rossy Nash MD  [facility]  [phone]         Attending Attestation (For Attendings USE Only)...